# Patient Record
Sex: MALE | Race: BLACK OR AFRICAN AMERICAN | URBAN - METROPOLITAN AREA
[De-identification: names, ages, dates, MRNs, and addresses within clinical notes are randomized per-mention and may not be internally consistent; named-entity substitution may affect disease eponyms.]

---

## 2017-04-17 ENCOUNTER — OFFICE VISIT (OUTPATIENT)
Dept: ORTHOPEDIC SURGERY | Age: 82
End: 2017-04-17

## 2017-04-17 VITALS
HEIGHT: 73 IN | HEART RATE: 167 BPM | DIASTOLIC BLOOD PRESSURE: 106 MMHG | SYSTOLIC BLOOD PRESSURE: 155 MMHG | BODY MASS INDEX: 20.41 KG/M2 | WEIGHT: 154 LBS

## 2017-04-17 DIAGNOSIS — M17.11 ARTHRITIS OF KNEE, RIGHT: Primary | ICD-10-CM

## 2017-04-17 RX ORDER — TRIAMCINOLONE ACETONIDE 40 MG/ML
60 INJECTION, SUSPENSION INTRA-ARTICULAR; INTRAMUSCULAR ONCE
Qty: 2 ML | Refills: 0
Start: 2017-04-17 | End: 2017-04-17

## 2017-04-17 RX ORDER — LIDOCAINE HYDROCHLORIDE 10 MG/ML
6 INJECTION INFILTRATION; PERINEURAL ONCE
Qty: 6 ML | Refills: 0
Start: 2017-04-17 | End: 2017-04-17

## 2017-04-17 NOTE — PROGRESS NOTES
Patient: Gordo Tyson                MRN: [id number]       SSN: [id number]  YOB: 1935        AGE: [de-identified] y.o. SEX: male  There is no height or weight on file to calculate BMI. PCP: Mendy Cary MD  04/17/17      No chief complaint on file. HISTORY OF PRESENT ILLNESS: Gordo Tyson is a [de-identified] y.o. male who presents to the office for 2 year hx of gradually increasing right knee pain. He has had no event or trauma that he remembers. He really hasn't had any treatment thus far. He has tried no home remedies. Review of Systems   Constitutional: Negative. HENT: Negative. Eyes: Negative. Respiratory: Negative. Cardiovascular: Negative. Gastrointestinal: Negative. Genitourinary: Negative. Musculoskeletal: Positive for joint pain (bilateral knees). Skin: Negative. Neurological: Negative. Endo/Heme/Allergies: Negative. Psychiatric/Behavioral: Negative. Social History     Social History    Marital status:      Spouse name: N/A    Number of children: N/A    Years of education: N/A     Occupational History    Not on file.      Social History Main Topics    Smoking status: Current Every Day Smoker     Packs/day: 0.50     Types: Cigarettes    Smokeless tobacco: Never Used    Alcohol use Yes      Comment: Occasionally and frequently    Drug use: Not on file    Sexual activity: Not on file     Other Topics Concern    Not on file     Social History Narrative    No narrative on file        Past Medical History:   Diagnosis Date    Arthritis     Arthropathy, unspecified, other specified sites     Cardiac arrhythmia     Chest pain     Hypercholesteremia     Hypertension     Nonspecific abnormal electrocardiogram (ECG) (EKG)     Other and unspecified hyperlipidemia     Prostate cancer (HCC)         Allergies   Allergen Reactions    Lisinopril Swelling         Current Outpatient Prescriptions   Medication Sig    levofloxacin (LEVAQUIN) 500 mg tablet Take 1 Tab by mouth daily.  methylPREDNISolone (MEDROL) 4 mg tablet Take  by mouth daily (with breakfast).  simvastatin (ZOCOR) 20 mg tablet Take  by mouth nightly.  amLODIPine (NORVASC) 5 mg tablet Take 5 mg by mouth daily.  oxyCODONE-acetaminophen (PERCOCET) 5-325 mg per tablet Take 1 Tab by mouth every four (4) hours as needed. No current facility-administered medications for this visit. Physical Exam  Constitutional: he is oriented to person, place, and time and well-developed, well-nourished, and in no distress. No distress. HENT:   Head: Normocephalic and atraumatic. Right Ear: Hearing normal.   Left Ear: Hearing normal.   Nose: Nose normal.   Eyes: Conjunctivae, EOM and lids are normal. Pupils are equal, round, and reactive to light. Neck: Trachea normal.   Pulmonary/Chest: Effort normal and breath sounds normal. No respiratory distress. Abdominal: Soft. Neurological: he is alert and oriented to person, place, and time. Skin: Skin is warm, dry and intact. he is not diaphoretic. Psychiatric: Affect normal.   Nursing note and vitals reviewed. Ortho Exam   Shows no effusion, soft tissue swelling, increased warmth. ROM is normal. Ligaments are stable. He had no TTP about the shoulder. Procedure: After timeout and under sterile conditions, patient's right knee was injected with 6 cc of Xylocaine and 1.5 cc of Kenalog.     [facility] - [facility]  OFFICE PROCEDURE PROGRESS NOTE        Chart reviewed for the following:   Kiersten Jarquin MD, have reviewed the History, Physical and updated the Allergic reactions for [address] performed immediately prior to start of procedure:   Kiersten Jarquin MD, have performed the following reviews on Omayra Canchola prior to the start of the procedure:            * Patient was identified by name and date of birth   * Agreement on procedure being performed was verified  * Risks and Benefits explained to the patient  * Procedure site verified and marked as necessary  * Patient was positioned for comfort  * Consent was signed and verified     Time: 5:02 PM        Date of procedure: 4/17/2017    Procedure performed by: Daniel Fair MD    Provider assisted by: None     Patient assisted by: self    How tolerated by patient: tolerated the procedure well with no complications    Comments: none      RADIOGRAPHS:   Showed medial joint space narrowing consistent with OA. IMPRESSION & PLAN: Arthritis right knee. With the pt's permission we performed a cortisone injection and given instructions in taking Tylenol alternating with Ibuprofen if the pain reoccurs. I will see him back depending on his response. Written by Genevieve Friend, as dictated by Dr. Ward Wilkes, Dr. Daniel Fair, confirm that all documentation is accurate.

## 2019-01-01 ENCOUNTER — OFFICE VISIT (OUTPATIENT)
Dept: CARDIOLOGY CLINIC | Age: 84
End: 2019-01-01

## 2019-01-01 ENCOUNTER — DOCUMENTATION ONLY (OUTPATIENT)
Dept: CARDIOLOGY CLINIC | Age: 84
End: 2019-01-01

## 2019-01-01 ENCOUNTER — OFFICE VISIT (OUTPATIENT)
Dept: ORTHOPEDIC SURGERY | Age: 84
End: 2019-01-01

## 2019-01-01 VITALS
HEIGHT: 73 IN | DIASTOLIC BLOOD PRESSURE: 70 MMHG | HEART RATE: 151 BPM | BODY MASS INDEX: 20.01 KG/M2 | WEIGHT: 151 LBS | SYSTOLIC BLOOD PRESSURE: 106 MMHG

## 2019-01-01 VITALS
RESPIRATION RATE: 20 BRPM | HEIGHT: 73 IN | HEART RATE: 117 BPM | SYSTOLIC BLOOD PRESSURE: 100 MMHG | DIASTOLIC BLOOD PRESSURE: 72 MMHG | BODY MASS INDEX: 19.92 KG/M2

## 2019-01-01 VITALS
HEIGHT: 73 IN | WEIGHT: 155.2 LBS | HEART RATE: 79 BPM | DIASTOLIC BLOOD PRESSURE: 72 MMHG | TEMPERATURE: 96.2 F | OXYGEN SATURATION: 100 % | BODY MASS INDEX: 20.57 KG/M2 | SYSTOLIC BLOOD PRESSURE: 118 MMHG

## 2019-01-01 DIAGNOSIS — M16.11 PRIMARY OSTEOARTHRITIS OF RIGHT HIP: ICD-10-CM

## 2019-01-01 DIAGNOSIS — I47.1 SVT (SUPRAVENTRICULAR TACHYCARDIA) (HCC): ICD-10-CM

## 2019-01-01 DIAGNOSIS — F17.200 SMOKER: ICD-10-CM

## 2019-01-01 DIAGNOSIS — I10 ESSENTIAL HYPERTENSION: ICD-10-CM

## 2019-01-01 DIAGNOSIS — R06.02 SOB (SHORTNESS OF BREATH): ICD-10-CM

## 2019-01-01 DIAGNOSIS — E78.5 DYSLIPIDEMIA: ICD-10-CM

## 2019-01-01 DIAGNOSIS — R07.9 CHEST PAIN, UNSPECIFIED TYPE: Primary | ICD-10-CM

## 2019-01-01 DIAGNOSIS — S32.810A CLOSED PELVIC RING FRACTURE, INITIAL ENCOUNTER (HCC): Primary | ICD-10-CM

## 2019-01-01 DIAGNOSIS — I47.1 SVT (SUPRAVENTRICULAR TACHYCARDIA) (HCC): Primary | ICD-10-CM

## 2019-01-01 DIAGNOSIS — M25.551 RIGHT HIP PAIN: Primary | ICD-10-CM

## 2019-01-01 RX ORDER — TRAMADOL HYDROCHLORIDE 50 MG/1
50 TABLET ORAL
Qty: 40 TAB | Refills: 0 | Status: SHIPPED | OUTPATIENT
Start: 2019-01-01 | End: 2019-01-01

## 2019-01-01 RX ORDER — TRIAMCINOLONE ACETONIDE 1 MG/G
CREAM TOPICAL 2 TIMES DAILY
COMMUNITY

## 2019-01-01 RX ORDER — LOVASTATIN 10 MG/1
TABLET ORAL
COMMUNITY

## 2019-01-01 RX ORDER — METOPROLOL TARTRATE 50 MG/1
25 TABLET ORAL 2 TIMES DAILY
Qty: 60 TAB | Refills: 6 | OUTPATIENT
Start: 2019-01-01 | End: 2019-01-01 | Stop reason: SDUPTHER

## 2019-01-01 RX ORDER — METOPROLOL TARTRATE 50 MG/1
50 TABLET ORAL 2 TIMES DAILY
Qty: 60 TAB | Refills: 6 | Status: SHIPPED | OUTPATIENT
Start: 2019-01-01

## 2019-01-01 RX ORDER — TRAMADOL HYDROCHLORIDE 50 MG/1
50 TABLET ORAL
Qty: 28 TAB | Refills: 0 | Status: SHIPPED | OUTPATIENT
Start: 2019-01-01 | End: 2019-01-01

## 2019-08-14 NOTE — PROGRESS NOTES
Patient: Delta Drummond                MRN: [id number]       SSN: [id number]  YOB: 1935        AGE: 82 y.o. SEX: male  Body mass index is 20.48 kg/m². PCP: Soto Hernandez MD  08/14/19    Chief Complaint: Left hip pain    HISTORY OF PRESENT ILLNESS:  Marino He is an 82year-old male who presents to the office today with acute/chronic left hip pain. He has had left hip pain for a while, but he unfortunately had a fall in July where he injured his left hip. He lost his balance and fell directly on his left side. He was seen at the [facility] ER. He was discharged home. He has been ambulating with a walker. He does report some pain in his left groin and some pain that radiates down the medial side of his thigh. He has not had any surgery. He has not had any injections. He was taking Hydrocodone, which helped some with the pain. Past Medical History:   Diagnosis Date    Arthritis     Arthropathy, unspecified, other specified sites     Cardiac arrhythmia     Chest pain     Hypercholesteremia     Hypertension     Nonspecific abnormal electrocardiogram (ECG) (EKG)     Other and unspecified hyperlipidemia     Prostate cancer (HCC)        Family History   Problem Relation Age of Onset    Cancer Neg Hx     Diabetes Neg Hx     Heart Attack Neg Hx     Stroke Neg Hx        Current Outpatient Medications   Medication Sig Dispense Refill    traMADol (ULTRAM) 50 mg tablet Take 1 Tab by mouth every six (6) hours as needed for Pain for up to 3 days. Max Daily Amount: 200 mg. 40 Tab 0    simvastatin (ZOCOR) 20 mg tablet Take  by mouth nightly.  amLODIPine (NORVASC) 5 mg tablet Take 5 mg by mouth daily.  oxyCODONE-acetaminophen (PERCOCET) 5-325 mg per tablet Take 1 Tab by mouth every four (4) hours as needed.  levofloxacin (LEVAQUIN) 500 mg tablet Take 1 Tab by mouth daily.  4 Tab 0    methylPREDNISolone (MEDROL) 4 mg tablet Take  by mouth daily (with breakfast).          Allergies   Allergen Reactions    Lisinopril Swelling       Past Surgical History:   Procedure Laterality Date    HX OTHER SURGICAL  1990's,2006       Social History     Socioeconomic History    Marital status:      Spouse name: Not on file    Number of children: Not on file    Years of education: Not on file    Highest education level: Not on file   Occupational History    Not on file   Social Needs    Financial resource strain: Not on file    Food insecurity:     Worry: Not on file     Inability: Not on file    Transportation needs:     Medical: Not on file     Non-medical: Not on file   Tobacco Use    Smoking status: Current Every Day Smoker     Packs/day: 0.50     Types: Cigarettes    Smokeless tobacco: Never Used   Substance and Sexual Activity    Alcohol use: Yes     Comment: Occasionally and frequently    Drug use: Not on file    Sexual activity: Not on file   Lifestyle    Physical activity:     Days per week: Not on file     Minutes per session: Not on file    Stress: Not on file   Relationships    Social connections:     Talks on phone: Not on file     Gets together: Not on file     Attends [employer] witness service: Not on file     Active member of club or organization: Not on file     Attends meetings of clubs or organizations: Not on file     Relationship status: Not on file    Intimate partner violence:     Fear of current or ex partner: Not on file     Emotionally abused: Not on file     Physically abused: Not on file     Forced sexual activity: Not on file   Other Topics Concern    Not on file   Social History Narrative    Not on file       REVIEW OF SYSTEMS:      CON: negative for recent weight loss/gain, fever, or chills  EYE: negative for double or blurry vision  ENT: negative for hoarseness  RS:   negative for cough, URI, SOB  CV:  negative for chest pain, palpitations  GI:    negative for blood in stool, nausea/vomiting  :  negative for blood in urine  MS: As per HPI  Other systems reviewed and noted below. PHYSICAL EXAMINATION:  Visit Vitals  /72   Pulse 79   Temp 96.2 °F (35.7 °C) (Oral)   Ht 6' 1\" (1.854 m)   Wt 155 lb 3.2 oz (70.4 kg)   SpO2 100%   BMI 20.48 kg/m²     Body mass index is 20.48 kg/m². GENERAL: Alert and oriented x3, in no acute distress, well-developed, well-nourished. HEENT: Normocephalic, atraumatic. RESP: Non labored breathing with equal chest rise on inspiration. CV: Well perfused extremities. No cyanosis or clubbing noted. ABDOMEN: Soft, non-tender, non-distended. PHYSICAL EXAM:  Physical exam of the left hip with pain with internal and external rotation with limited motion. He is tender to palpation along the pelvis. He is nontender over the lateral hip. He is neurovascularly intact distally. IMAGING:  X-rays were reviewed from the ER, which show a right LC1 pelvic ring injury along with a CT scan, which shows this, as well as right hip osteoarthritis. ASSESSMENT AND PLAN:   Livan Phelps is an 82year-old male with chronic right hip arthritis with an acute right pelvis injury. I have recommended weightbearing as tolerated, as well as Tramadol for the pain. I would like for him to try to get over the fracture over the next few months before we talk about doing anything more definitively for his hip arthritis, which was likely flared up after his injury. I will plan to see him back in a month.               Electronically signed by: Kenn Green MD

## 2019-09-04 NOTE — LETTER
Briana Palencia 1935 
 
9/4/2019 Dear Bridgette Denver, MD 
 
I had the pleasure of evaluating  Mr. Jairo Duval in office today. Below are the relevant portions of my assessment and plan of care. ICD-10-CM ICD-9-CM 1. Chest pain, unspecified type R07.9 786.50 AMB POC EKG ROUTINE W/ 12 LEADS, INTER & REP  
   NUCLEAR CARDIAC STRESS TEST 2. SOB (shortness of breath) R06.02 786.05 ECHO ADULT COMPLETE 3. Smoker F17.200 305.1 4. Dyslipidemia E78.5 272.4 5. Essential hypertension I10 401.9 Current Outpatient Medications Medication Sig Dispense Refill  lovastatin (MEVACOR) 10 mg tablet Take  by mouth nightly.  triamcinolone acetonide (KENALOG) 0.1 % topical cream Apply  to affected area two (2) times a day. use thin layer  amLODIPine (NORVASC) 5 mg tablet Take 5 mg by mouth daily.  simvastatin (ZOCOR) 20 mg tablet Take  by mouth nightly. Orders Placed This Encounter  AMB POC EKG ROUTINE W/ 12 LEADS, INTER & REP Order Specific Question:   Reason for Exam: Answer:   chest pain  lovastatin (MEVACOR) 10 mg tablet Sig: Take  by mouth nightly.  triamcinolone acetonide (KENALOG) 0.1 % topical cream  
  Sig: Apply  to affected area two (2) times a day. use thin layer If you have questions, please do not hesitate to call me. I look forward to following Mr. Jairo Duval along with you.  
 
Sincerely, 
Jimenez Porras MD

## 2019-09-04 NOTE — PROGRESS NOTES
HISTORY OF PRESENT ILLNESS  Nahum Rodriguez is a 82 y.o. male. New Patient   The history is provided by the patient. This is a recurrent problem. The problem occurs every several days. The problem has not changed since onset. Associated symptoms include shortness of breath. Pertinent negatives include no chest pain, no abdominal pain and no headaches. Chest Pain (Angina)    The history is provided by the patient. This is a recurrent problem. The problem has not changed since onset. The problem occurs every several days. The pain is present in the left side. The pain is at a severity of 3/10. The pain is mild. The quality of the pain is described as tightness. Associated symptoms include shortness of breath. Pertinent negatives include no abdominal pain, no claudication, no cough, no diaphoresis, no dizziness, no fever, no headaches, no hemoptysis, no nausea, no orthopnea, no palpitations, no PND, no sputum production, no vomiting and no weakness. Risk factors include smoking/tobacco exposure, dyslipidemia, hypertension and male gender. Shortness of Breath   The history is provided by the patient. This is a chronic problem. The problem occurs frequently. The problem has not changed since onset. Pertinent negatives include no fever, no headaches, no ear pain, no neck pain, no cough, no sputum production, no hemoptysis, no wheezing, no PND, no orthopnea, no chest pain, no vomiting, no abdominal pain, no rash, no leg swelling and no claudication. Associated medical issues include COPD. Review of Systems   Constitutional: Negative for chills, diaphoresis, fever and weight loss. HENT: Negative for ear pain and hearing loss. Eyes: Negative for blurred vision. Respiratory: Positive for shortness of breath. Negative for cough, hemoptysis, sputum production, wheezing and stridor. Cardiovascular: Negative for chest pain, palpitations, orthopnea, claudication, leg swelling and PND.    Gastrointestinal: Negative for abdominal pain, heartburn, nausea and vomiting. Musculoskeletal: Negative for myalgias and neck pain. Skin: Negative for rash. Neurological: Negative for dizziness, tingling, tremors, focal weakness, loss of consciousness, weakness and headaches. Psychiatric/Behavioral: Negative for depression and suicidal ideas. Family History   Problem Relation Age of Onset    Cancer Neg Hx     Diabetes Neg Hx     Heart Attack Neg Hx     Stroke Neg Hx        Past Medical History:   Diagnosis Date    Arthritis     Arthropathy, unspecified, other specified sites     Cardiac arrhythmia     Chest pain     Hypercholesteremia     Hypertension     Nonspecific abnormal electrocardiogram (ECG) (EKG)     Other and unspecified hyperlipidemia     Prostate cancer [facility])        Past Surgical History:   Procedure Laterality Date    HX OTHER SURGICAL  1990's,2006       Social History     Tobacco Use    Smoking status: Current Every Day Smoker     Packs/day: 0.50     Types: Cigarettes    Smokeless tobacco: Never Used   Substance Use Topics    Alcohol use: Yes     Comment: Occasionally and frequently       Allergies   Allergen Reactions    Lisinopril Swelling       Outpatient Medications Marked as Taking for the 9/4/19 encounter (Office Visit) with Veronique Diaz MD   Medication Sig Dispense Refill    lovastatin (MEVACOR) 10 mg tablet Take  by mouth nightly.  triamcinolone acetonide (KENALOG) 0.1 % topical cream Apply  to affected area two (2) times a day. use thin layer      amLODIPine (NORVASC) 5 mg tablet Take 5 mg by mouth daily. Visit Vitals  /70   Pulse (!) 151   Ht 6' 1\" (1.854 m)   Wt 68.5 kg (151 lb)   BMI 19.92 kg/m²     Physical Exam   Constitutional: He is oriented to person, place, and time. He appears well-developed and well-nourished. No distress. HENT:   Head: Atraumatic. Mouth/Throat: No oropharyngeal exudate.    Eyes: Conjunctivae are normal. Right eye exhibits no discharge. Left eye exhibits no discharge. No scleral icterus. Neck: Neck supple. No JVD present. No tracheal deviation present. No thyromegaly present. Cardiovascular: Normal rate and regular rhythm. Exam reveals no gallop. No murmur heard. Pulmonary/Chest: Effort normal and breath sounds normal. No stridor. He has no wheezes. He has no rales. Abdominal: Soft. There is no tenderness. There is no rebound. Musculoskeletal: Normal range of motion. He exhibits no edema or tenderness. Neurological: He is alert and oriented to person, place, and time. He exhibits normal muscle tone. Skin: Skin is warm. He is not diaphoretic. Psychiatric: He has a normal mood and affect. His behavior is normal.     ekg sinus tachycardia with no acute st-t changes  ASSESSMENT and PLAN    ICD-10-CM ICD-9-CM    1. Chest pain, unspecified type R07.9 786.50 AMB POC EKG ROUTINE W/ 12 LEADS, INTER & REP      NUCLEAR CARDIAC STRESS TEST   2. SOB (shortness of breath) R06.02 786.05 ECHO ADULT COMPLETE   3. Smoker F17.200 305.1    4. Dyslipidemia E78.5 272.4    5. Essential hypertension I10 401.9      Orders Placed This Encounter    AMB POC EKG ROUTINE W/ 12 LEADS, INTER & REP     Order Specific Question:   Reason for Exam:     Answer:   chest pain         current treatment plan is effective, no change in therapy  very strongly urged to quit smoking to reduce cardiovascular risk  use of aspirin to prevent MI and TIA's discussed. Patient is 82-year-old female with long-standing history of hypertension, dyslipidemia and smoking history seen for chest pain. Chest pain has both typical and atypical features. Describes occasional heaviness however other times he has pain following cough. Has moderate exertional dyspnea with no orthopnea or PND. Given multiple cardiac risk factors we will proceed with echocardiogram and nuclear stress test to evaluate CAD. Unable to walk on treadmill due to recent fall and severe hip pain.

## 2019-09-04 NOTE — PATIENT INSTRUCTIONS
Chest Pain: Care Instructions  Your Care Instructions    There are many things that can cause chest pain. Some are not serious and will get better on their own in a few days. But some kinds of chest pain need more testing and treatment. Your doctor may have recommended a follow-up visit in the next 8 to 12 hours. If you are not getting better, you may need more tests or treatment. Even though your doctor has released you, you still need to watch for any problems. The doctor carefully checked you, but sometimes problems can develop later. If you have new symptoms or if your symptoms do not get better, get medical care right away. If you have worse or different chest pain or pressure that lasts more than 5 minutes or you passed out (lost consciousness), call 911 or seek other emergency help right away. A medical visit is only one step in your treatment. Even if you feel better, you still need to do what your doctor recommends, such as going to all suggested follow-up appointments and taking medicines exactly as directed. This will help you recover and help prevent future problems. How can you care for yourself at home? · Rest until you feel better. · Take your medicine exactly as prescribed. Call your doctor if you think you are having a problem with your medicine. · Do not drive after taking a prescription pain medicine. When should you call for help? Call 911 if:    · You passed out (lost consciousness).     · You have severe difficulty breathing.     · You have symptoms of a heart attack. These may include:  ? Chest pain or pressure, or a strange feeling in your chest.  ? Sweating. ? Shortness of breath. ? Nausea or vomiting. ? Pain, pressure, or a strange feeling in your back, neck, jaw, or upper belly or in one or both shoulders or arms. ? Lightheadedness or sudden weakness. ? A fast or irregular heartbeat.   After you call 911, the  may tell you to chew 1 adult-strength or 2 to 4 low-dose aspirin. Wait for an ambulance. Do not try to drive yourself.    Call your doctor today if:    · You have any trouble breathing.     · Your chest pain gets worse.     · You are dizzy or lightheaded, or you feel like you may faint.     · You are not getting better as expected.     · You are having new or different chest pain. Where can you learn more? Go to http://stanislav-jennifer.info/. Enter A120 in the search box to learn more about \"Chest Pain: Care Instructions. \"  Current as of: September 23, 2018  Content Version: 12.1  © 7763-6135 [employer]. Care instructions adapted under license by [employer] (which disclaims liability or warranty for this information). If you have questions about a medical condition or this instruction, always ask your healthcare professional. [address] any warranty or liability for your use of this information. Chest Pain: Care Instructions  Your Care Instructions    There are many things that can cause chest pain. Some are not serious and will get better on their own in a few days. But some kinds of chest pain need more testing and treatment. Your doctor may have recommended a follow-up visit in the next 8 to 12 hours. If you are not getting better, you may need more tests or treatment. Even though your doctor has released you, you still need to watch for any problems. The doctor carefully checked you, but sometimes problems can develop later. If you have new symptoms or if your symptoms do not get better, get medical care right away. If you have worse or different chest pain or pressure that lasts more than 5 minutes or you passed out (lost consciousness), call 911 or seek other emergency help right away. A medical visit is only one step in your treatment.  Even if you feel better, you still need to do what your doctor recommends, such as going to all suggested follow-up appointments and taking medicines exactly as directed. This will help you recover and help prevent future problems. How can you care for yourself at home? · Rest until you feel better. · Take your medicine exactly as prescribed. Call your doctor if you think you are having a problem with your medicine. · Do not drive after taking a prescription pain medicine. When should you call for help? Call 911 if:    · You passed out (lost consciousness).     · You have severe difficulty breathing.     · You have symptoms of a heart attack. These may include:  ? Chest pain or pressure, or a strange feeling in your chest.  ? Sweating. ? Shortness of breath. ? Nausea or vomiting. ? Pain, pressure, or a strange feeling in your back, neck, jaw, or upper belly or in one or both shoulders or arms. ? Lightheadedness or sudden weakness. ? A fast or irregular heartbeat. After you call 911, the  may tell you to chew 1 adult-strength or 2 to 4 low-dose aspirin. Wait for an ambulance. Do not try to drive yourself.    Call your doctor today if:    · You have any trouble breathing.     · Your chest pain gets worse.     · You are dizzy or lightheaded, or you feel like you may faint.     · You are not getting better as expected.     · You are having new or different chest pain. Where can you learn more? Go to http://stanislav-jennifer.info/. Enter A120 in the search box to learn more about \"Chest Pain: Care Instructions. \"  Current as of: September 23, 2018  Content Version: 12.1  © 7759-7255 [employer]. Care instructions adapted under license by [employer] (which disclaims liability or warranty for this information). If you have questions about a medical condition or this instruction, always ask your healthcare professional. [address] any warranty or liability for your use of this information. [employer] Activation    Thank you for requesting access to [employer].  Please follow the instructions below to securely access and download your online medical record. [employer] allows you to send messages to your doctor, view your test results, renew your prescriptions, schedule appointments, and more. How Do I Sign Up? 1. In your internet browser, go to [email or web address]. 2. Click on the First Time User? Click Here link in the Sign In box. You will see the New Member Sign Up page. 3. Enter your V[employer] Access Code exactly as it appears below. You will not need to use this code after youve completed the sign-up process. If you do not sign up before the expiration date, you must request a new code. [employer] Access Code: [email or web address]  Expires: 2019  9:49 AM (This is the date your [employer] access code will )    4. Enter the last four digits of your Social Security Number (xxxx) and Date of Birth (mm/dd/yyyy) as indicated and click Submit. You will be taken to the next sign-up page. 5. Create a Vidyo ID. This will be your [employer] login ID and cannot be changed, so think of one that is secure and easy to remember. 6. Create a V[employer]yo password. You can change your password at any time. 7. Enter your Password Reset Question and Answer. This can be used at a later time if you forget your password. 8. Enter your e-mail address. You will receive e-mail notification when new information is available in [address] 9. Click Sign Up. You can now view and download portions of your medical record. 10. Click the Download Summary menu link to download a portable copy of your medical information. Additional Information    If you have questions, please visit the Frequently Asked Questions section of the [employer] website at [email or web address] Remember, [employer] is NOT to be used for urgent needs. For medical emergencies, dial 911.

## 2019-09-10 NOTE — PROGRESS NOTES
Patient hooked up to 12 lead EKG, noted to be in . C/O SOB, chest pain and palpitations. Attempted vagal maneuvers, unsuccessful. Dr. Chayo Robles in with patient - carotid massage performed, HR slowed to  EKG SR with PVC   Lopressor 5 mg administered slow IVP with NS bolus of 100ml. HR remained 100s, sinus tach. Symptoms resolved. Stress test cancelled for today. Will proceed with echocardiogram.  Will d/c Amlodipine and begin Metoprolol 50 mg/ BID  Plan for remainder of stress test on Thursday  Above discussed with patient and daughter who are amenable to plan.

## 2019-09-11 NOTE — PROGRESS NOTES
Patient: Petrona Griffin                MRN: [id number]       SSN: [id number]  YOB: 1935        AGE: 82 y.o. SEX: male  Body mass index is 19.92 kg/m². PCP: Ramiro Yo MD  09/11/19    Chief Complaint: Right hip pain    HISTORY OF PRESENT ILLNESS:  Mike Allan returns to the office today for his pelvic ring injury. He has been ambulating with a walker. He does not have pain at rest, but when he gets up and puts weight on his legs or walks, he does have pain, mostly in the right hip. He has been taking Tramadol for the pain, which has been helping. Of note, he has recently been having some abdominal and chest pain, which he has been worked up in cardiology for with a planned stress test later this week. Past Medical History:   Diagnosis Date    Arthritis     Arthropathy, unspecified, other specified sites     Cardiac arrhythmia     Chest pain     Hypercholesteremia     Hypertension     Nonspecific abnormal electrocardiogram (ECG) (EKG)     Other and unspecified hyperlipidemia     Prostate cancer (HCC)        Family History   Problem Relation Age of Onset    Cancer Neg Hx     Diabetes Neg Hx     Heart Attack Neg Hx     Stroke Neg Hx        Current Outpatient Medications   Medication Sig Dispense Refill    traMADol (ULTRAM) 50 mg tablet Take 1 Tab by mouth every six (6) hours as needed for Pain for up to 7 days. Max Daily Amount: 200 mg. 28 Tab 0    metoprolol tartrate (LOPRESSOR) 50 mg tablet Take 1 Tab by mouth two (2) times a day. 60 Tab 6    lovastatin (MEVACOR) 10 mg tablet Take  by mouth nightly.  triamcinolone acetonide (KENALOG) 0.1 % topical cream Apply  to affected area two (2) times a day. use thin layer      simvastatin (ZOCOR) 20 mg tablet Take  by mouth nightly.            Allergies   Allergen Reactions    Lisinopril Swelling       Past Surgical History:   Procedure Laterality Date    HX OTHER SURGICAL  1990's,2006       Social History Socioeconomic History    Marital status:      Spouse name: Not on file    Number of children: Not on file    Years of education: Not on file    Highest education level: Not on file   Occupational History    Not on file   Social Needs    Financial resource strain: Not on file    Food insecurity:     Worry: Not on file     Inability: Not on file    Transportation needs:     Medical: Not on file     Non-medical: Not on file   Tobacco Use    Smoking status: Current Every Day Smoker     Packs/day: 0.50     Types: Cigarettes    Smokeless tobacco: Never Used   Substance and Sexual Activity    Alcohol use: Yes     Comment: Occasionally and frequently    Drug use: Not on file    Sexual activity: Not on file   Lifestyle    Physical activity:     Days per week: Not on file     Minutes per session: Not on file    Stress: Not on file   Relationships    Social connections:     Talks on phone: Not on file     Gets together: Not on file     Attends [employer] service: Not on file     Active member of club or organization: Not on file     Attends meetings of clubs or organizations: Not on file     Relationship status: Not on file    Intimate partner violence:     Fear of current or ex partner: Not on file     Emotionally abused: Not on file     Physically abused: Not on file     Forced sexual activity: Not on file   Other Topics Concern    Not on file   Social History Narrative    Not on file       REVIEW OF SYSTEMS:      No changes from previous review of systems unless noted. PHYSICAL EXAMINATION:  Visit Vitals  /72 (BP 1 Location: Left arm, BP Patient Position: Sitting)   Pulse (!) 117   Resp 20   Ht 6' 1\" (1.854 m)   BMI 19.92 kg/m²     Body mass index is 19.92 kg/m². GENERAL: Alert and oriented x3, in no acute distress. HEENT: Normocephalic, atraumatic. RESP: Non labored breathing. SKIN: No rashes or lesions noted.    PHYSICAL EXAM:  Physical exam of the right hip was nontender to palpation. No obvious pain with hip compression. No pain with gentle hip internal and external rotation, but he does have some limitations in his motion and pain at terminal end points. He is otherwise neurovascularly intact. ASSESSMENT AND PLAN:   Gerald Mcguire continues to recover from his pelvic ring injury. I recommend conservative management still. We refilled his Tramadol today. We will see him back in a month with x-rays.              Electronically signed by: Bernice Sen MD